# Patient Record
Sex: MALE | Race: WHITE
[De-identification: names, ages, dates, MRNs, and addresses within clinical notes are randomized per-mention and may not be internally consistent; named-entity substitution may affect disease eponyms.]

---

## 2019-03-15 ENCOUNTER — HOSPITAL ENCOUNTER (INPATIENT)
Dept: HOSPITAL 80 - F3N | Age: 74
LOS: 5 days | Discharge: NURSING, REHAB OR LONG-TERM CARE | DRG: 455 | End: 2019-03-20
Attending: NEUROLOGICAL SURGERY | Admitting: NEUROLOGICAL SURGERY
Payer: COMMERCIAL

## 2019-03-15 DIAGNOSIS — M96.0: Primary | ICD-10-CM

## 2019-03-15 DIAGNOSIS — Z98.1: ICD-10-CM

## 2019-03-15 DIAGNOSIS — R35.1: ICD-10-CM

## 2019-03-15 DIAGNOSIS — N40.1: ICD-10-CM

## 2019-03-15 PROCEDURE — C1713 ANCHOR/SCREW BN/BN,TIS/BN: HCPCS

## 2019-03-15 PROCEDURE — C1763 CONN TISS, NON-HUMAN: HCPCS

## 2019-03-15 PROCEDURE — 0SG3071 FUSION OF LUMBOSACRAL JOINT WITH AUTOLOGOUS TISSUE SUBSTITUTE, POSTERIOR APPROACH, POSTERIOR COLUMN, OPEN APPROACH: ICD-10-PCS | Performed by: NEUROLOGICAL SURGERY

## 2019-03-15 PROCEDURE — 0QP004Z REMOVAL OF INTERNAL FIXATION DEVICE FROM LUMBAR VERTEBRA, OPEN APPROACH: ICD-10-PCS | Performed by: NEUROLOGICAL SURGERY

## 2019-03-15 PROCEDURE — 0SG30A0 FUSION OF LUMBOSACRAL JOINT WITH INTERBODY FUSION DEVICE, ANTERIOR APPROACH, ANTERIOR COLUMN, OPEN APPROACH: ICD-10-PCS | Performed by: NEUROLOGICAL SURGERY

## 2019-03-15 PROCEDURE — 0SB40ZZ EXCISION OF LUMBOSACRAL DISC, OPEN APPROACH: ICD-10-PCS | Performed by: NEUROLOGICAL SURGERY

## 2019-03-15 PROCEDURE — 0SG0071 FUSION OF LUMBAR VERTEBRAL JOINT WITH AUTOLOGOUS TISSUE SUBSTITUTE, POSTERIOR APPROACH, POSTERIOR COLUMN, OPEN APPROACH: ICD-10-PCS | Performed by: NEUROLOGICAL SURGERY

## 2019-03-15 RX ADMIN — Medication SCH MLS: at 16:25

## 2019-03-15 RX ADMIN — Medication SCH MLS: at 23:30

## 2019-03-15 RX ADMIN — MEPERIDINE HYDROCHLORIDE PRN MG: 25 INJECTION, SOLUTION INTRAMUSCULAR; INTRAVENOUS; SUBCUTANEOUS at 13:35

## 2019-03-15 RX ADMIN — DIAZEPAM SCH MG: 5 TABLET ORAL at 20:05

## 2019-03-15 RX ADMIN — FAMOTIDINE SCH MG: 20 TABLET, FILM COATED ORAL at 20:05

## 2019-03-15 RX ADMIN — OXYCODONE HYDROCHLORIDE PRN MG: 15 TABLET ORAL at 16:23

## 2019-03-15 RX ADMIN — DIAZEPAM SCH MG: 5 TABLET ORAL at 16:23

## 2019-03-15 RX ADMIN — MEPERIDINE HYDROCHLORIDE PRN MG: 25 INJECTION, SOLUTION INTRAMUSCULAR; INTRAVENOUS; SUBCUTANEOUS at 13:31

## 2019-03-15 RX ADMIN — MORPHINE SULFATE SCH MG: 30 TABLET, EXTENDED RELEASE ORAL at 20:05

## 2019-03-15 RX ADMIN — SODIUM CHLORIDE SCH MLS: 900 INJECTION, SOLUTION INTRAVENOUS at 14:58

## 2019-03-15 RX ADMIN — HYDROMORPHONE HYDROCHLORIDE PRN MG: 1 INJECTION, SOLUTION INTRAMUSCULAR; INTRAVENOUS; SUBCUTANEOUS at 14:40

## 2019-03-15 RX ADMIN — ACETAMINOPHEN SCH: 500 TABLET ORAL at 14:53

## 2019-03-15 RX ADMIN — ACETAMINOPHEN SCH MG: 500 TABLET ORAL at 20:07

## 2019-03-15 RX ADMIN — DIAZEPAM SCH: 5 TABLET ORAL at 14:52

## 2019-03-15 RX ADMIN — DOCUSATE SODIUM AND SENNOSIDES SCH TAB: 50; 8.6 TABLET ORAL at 20:05

## 2019-03-15 NOTE — PDHPUP
History & Physical Update


H&P update statement: 


This history and physical update is based on an assessment of the patient which 

was completed after admission or registration (within 24 hours), but prior to 

the surgery/procedure.





H&P update: H&P reviewed & patient examined, no change in patient's condition 

since H&P completed (Consents signed and site marked. All questions answered.  )

## 2019-03-15 NOTE — POSTOPPROG
Post Op Note


Date of Operation: 03/15/19


Surgeon: Finesse Alcaraz


Assistant: ARACELIS Deng PAC


Anesthesia: GET(General Endotracheal)


Pre-op Diagnosis: Psedoarthoresis, back pain


Post-op Diagnosis: Psedoarthoresis, back pain


Indication: Psedoarthoresis, back pain


Procedure: L5/S1 ALIF, L4-S1 exploration and revison of prior fusion


Inf/Abcess present in the surg proc area at time of surgery?: No


EBL: 100-500


Drains: Hemovac, Diego Stroud





PA Addendum





- Addendum


.: 





S: low back pain


O: NAD A&Ox3 MAEx4 5/5 and equal in BUE and BLE





74y/o male s/p L5/S1 ALIF, L4-S1 exploration and revision of prior fusion





-Post op xrays pending


-PT/OT


-JULIA x1


-CLD, advance per general surgery


-LSO when OOB


-DVT prophx: TEDs, SCDs, Lovenox okay POD 1


-Please notify NS with any change in neuro/motor exam

## 2019-03-15 NOTE — ASMTCMCOM
CM Note

 

CM Note                       

Notes:

Pt had planned spinal surgery, resides with wife. PT/OT evals pending. CM to follow for d/c 

planning. 

 

Date Signed:  03/15/2019 03:21 PM

Electronically Signed By:BENEDICT Naylor

## 2019-03-15 NOTE — GOP
[f 
rep st]



                                                                OPERATIVE REPORT





DATE OF OPERATION:  03/15/2019



SURGEON:  Finesse Alcaraz MD



CO-SURGEON:  Brad Leal MD



ASSISTANT:  LUIS De La Rosa



ANESTHESIA:  General.



COMPLICATIONS:  None.



PREOPERATIVE DIAGNOSIS:  

1.  L5-S1 pseudoarthrosis with history of prior lumbar fusion L4-S1 as well as 
bilateral sacroiliac joint fusion.

2.  Ongoing low back pain.

3.  Treatment refractory to non-operative intervention.



POSTOPERATIVE DIAGNOSIS:  

1.  L5-S1 pseudoarthrosis with history of prior lumbar fusion L4-S1 as well as 
bilateral sacroiliac joint fusion.

2.  Ongoing low back pain.

3.  Treatment refractory to non-operative intervention.



PROCEDURE PERFORMED:  

1.  Anterior arthrodesis with approach to L5-S1.

2.  Exploration of prior anterior lumbar hardware and subsequent removal of 
anterior interbody hardware at the L5-S1 level.

3.  Redo L5-S1 diskectomy and interbody fusion using a 12 degree 16 x 30 x 24 
mm Medtronic perimeter peek cage filled with morselized autograft and allograft.

4.  Anterior lumbar fusion L5-S1 with a large Medtronic Pivox plate with 1 
screw into L5 and 1 screw into S1.

5.  Use of intraoperative fluoroscopy, less than 1 hour physician time.

6.  Use of neuromonitoring.



FINDINGS:  per imaging



SPECIMENS:  None.



ESTIMATED BLOOD LOSS:  150 mL.



INDICATIONS:  The patient is a very pleasant gentleman who has undergone 
multiple lumbar prior spinal surgeries including bilateral SI joint fusions.  
He continued to have ongoing low back pain.  Imaging demonstrated some 
lucencies around the bilateral S1 screws with no definitive evidence of L5-S1 
fusion.  After discussion of risks, benefits, and treatment alternatives, we 
decided to proceed with surgery as described above.  Please note, this is stage 
I of a planned 2 stage surgical procedure.  Stage 2 will be a posterior 
approach described in a separate operative report.



DESCRIPTION OF PROCEDURE:  The patient was brought to operating theater and 
underwent general endotracheal anesthesia without complications.  He had 
Venodynes, NADEEN hose, and the appropriate lines placed by Anesthesia.  His arms 
were placed across and a small bump placed on the lumbar spine.  The lower 
abdominal region was prepped and draped in the usual sterile surgical fashion.  
A time-out was completed per protocol and the patient received antibiotics 
within 1 hour of incision. 



Dr. Leal and his team will then dictate in a separate operative report the 
anterior abdominal approach to get us to the anterior aspect of L5-S1.  Once 
this was completed, we confirmed our level using lateral fluoroscopy.   We 
incised the anterior L5-S1 disk space with an 11 blade.  We were able to 
identify the prior hardware at the L5-S1 space and this did appear to be loose 
upon further exploration with a Kocher.  We used the bur tip on the drill bit 
and osteotomes to remove the prior interbody cage at the L5-S1 level which was 
then passed off the field.  We used the drill bit, curettes and Kerrison 
punches to complete a redo L5-S1 diskectomy.  We prepared the cartilaginous 
endplates and measured the interbody space.  We placed a 12 degree 16 x 30 x 24 
mm Perimeter peek cage filled with morselized autograft and allograft into the 
L5-S1 disk space.  We then drilled down the anterior osteophytes and secured a 
large Medtronic Pivox plate with a screw into L5 and a screw into S1.  



AP and lateral x-rays demonstrated good placement of the hardware.  Dr. Brad Leal will then dictate in a separate operative report the abdominal closure. 



The patient's neuro monitoring was stable by the end of this case.  The sponge, 
needle and instrument counts were correct.





Job #:  [id number]

MTDD

## 2019-03-15 NOTE — PDMN
Medical Necessity


Medical necessity: MCG:  S820 lumbar fusion   INPT only   OP:   L5/S1 

anterior lumbar fusion  L5/S1 posterior lumbar fusion with hardware revision--.

  AUTH# [id number] APPROVED FOR CPT CODES 53748, 85864, 91190, 66550, 90654, 

11601 AND 32366 TO BE DONE INPATIENT. 2 DAYS LOS.

## 2019-03-15 NOTE — GOP
[f 
rep st]



                                                                OPERATIVE REPORT





DATE OF OPERATION:  03/15/2019



SURGEON:  Finesse Alcaraz MD



ASSISTANT:  Belle Deng PA-C.



ANESTHESIA:  General.



PREOPERATIVE DIAGNOSIS:  

1.  L5-S1 pseudoarthrosis with history of prior lumbar fusion L4 through S1 and 
bilateral sacroiliac joint fusions.

2.  Low back pain. 

3.  Treatment refractory to non-operative intervention.



POSTOPERATIVE DIAGNOSIS:  

1.  L5-S1 pseudoarthrosis with history of prior lumbar fusion L4 through S1 and 
bilateral sacroiliac joint fusions.

2.  Low back pain. 

3.  Treatment refractory to non-operative intervention.



PROCEDURE PERFORMED:  

1.  Posterior arthrodesis with approach to L4, L5, and S1.

2.  Exploration of lumbar hardware with subsequent removal of L4, L5, and S1 
pedicle screws from the Solera 4.75 system.

3.  Posterolateral fusion with bilateral pedicle screw placement into L4, L5, 
and S1 from the Medtronic Solera 5.5/6.0 system.

4.  Posterolateral fusion on the left between L4 and S1 with morselized 
autograft and allograft.

5.  Use of intraoperative 3D Stealth navigation.

6.  Use of intraoperative fluoroscopy, less than 1 hour physician time.

7.  Use of neuromonitoring.

8.  Injection of preservative-free intrathecal narcotics.



FINDINGS:  per imaging



SPECIMENS:  Cultures were taken from the old hardware was sent to microbiology.



ESTIMATED BLOOD LOSS:  30 mL



INDICATIONS:  The patient is a very pleasant gentleman who has undergone 
multiple spinal fusions including bilateral SI joint fusions for ongoing low 
back pain.  Imaging demonstrated a possible loosening of the bilateral S1 screws
, and there was concern for possible pseudoarthrosis.  After discussion of the 
risks, benefits, and alternatives, and after failing nonoperative intervention, 
I decided to proceed forth with surgery as described above.



DESCRIPTION OF PROCEDURE:  Please note that this is stage 2 of a planned 2-
stage surgical procedure.  Stage 1 is dictated in a separate operative report 
and was the anterior approach to the L5-S1. 



The patient was already asleep at the time of the completion of stage 1.  At 
this point, he was flipped prone onto the Diego table, and all bony 
prominences were inspected and padded.  The previous lumbar incision was 
identified and marked.  This area was prepped and draped in usual sterile 
surgical fashion.  A time-out was completed again per protocol, and the patient 
received re-dosing of antibiotics per protocol.  The incision was infiltrated 
with Marcaine with epinephrine.  Using the monopolar, a dissection was taken 
down midline to the lumbodorsal fascia, and we completed out dissection 
laterally to identify the prior hardware at the L4, L5, and S1 levels.  Care 
was taken to avoid the midline defect from his prior laminectomy.  We removed 
the bilateral cap screws in the L4, L5, and S1 levels, as well as bilateral 
rods and passed them off the field.  We then used a distractor to explore the L4
-L5 and L5-S1 levels.  The S1 screws were noted to be slightly loose.  We then 
removed the pedicle screws from the L4, L5, and S1 levels and culture swab of 
these to ensure that there was no infection.  We then replaced the screws with 
7.5 x 50 mm screw on the left at L4, 7.5 x 55 mm screw on the right at L4, 7.5 
x 40 mm screw on the left at L5, 7.5 x 45 mm screw on the right L5, and left at 
S1 an 8.5 x 45 mm screw on the right S1, all from the Medtronics Solera 5.5 x 
60 System.  A 3D navigation spin demonstrated good placement of the hardware.  



We decorticated the bone on the left side between L4 and S1.  We placed 2 
lordotic rods into the heads of the screws between L4 and S1 and secured them 
down with cap screws which were then tightened to the 's setting.  
We placed morselized autograft and allograft on the left side between L4 and S1 
for the posterolateral fusion.  We injected preservative-free intrathecal 
narcotics.  A drain was left in the subfascial space.  The wound then closed in 
multiple layers including Vicryl sutures deep layers and Dermabond for skin.  
The patient's wound was dressed sterilely.  



He was flipped supine onto the transfer cart, where he was awakened, extubated, 
taken to recovery room in stable condition. 



There were no complications and no noted changes on neuromonitoring throughout 
the procedure.



COMPLICATIONS:  None.





Job #:  [id number]

MTDD

## 2019-03-15 NOTE — PDANEPAE
ANE History of Present Illness





previous spine surgeries with ongoing lumbosacral radiculopathy





ANE Past Medical History





- Cardiovascular History


Hx Hypertension: No


Hx Arrhythmias: No


Hx Chest Pain: No


Hx Coronary Artery / Peripheral Vascular Disease: No


Hx CHF / Valvular Disease: No


Hx Palpitations: No





- Pulmonary History


Hx COPD: No


Hx Asthma/Reactive Airway Disease: No


Hx Recent Upper Respiratory Infection: No


Hx Oxygen in Use at Home: No


Hx Sleep Apnea: No


Sleep Apnea Screening Result - Last Documented: Negative


Pulmonary History Comment: USES O2 WHEN AT 10,000 FT AT TIMES TO PREVENT 

HEADACHES





- Neurologic History


Hx Cerebrovascular Accident: No


Hx Seizures: No


Hx Dementia: No


Neurologic History Comment: HX OF SPINAL FUSIONS





- Endocrine History


Hx Diabetes: No





- Renal History


Hx Renal Disorders: Yes


Renal History Comment: HX OF PREV STONE





- Liver History


Hx Hepatic Disorders: No





- Neurological & Psychiatric Hx


Hx Neurological and Psychiatric Disorders: No





- Cancer History


Hx Cancer: Yes


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





- Congenital Disorder History


Hx Congenital Disorders: No





- GI History


Hx Gastrointestinal Disorders: Yes


Gastrointestinal History Comment: CONSTIPATION





- Other Health History


Other Health History: SACROLITIS.  WEARS GLASSES





- Chronic Pain History


Chronic Pain: Yes (LOWER BACK WITH HIPS)





- Surgical History


Prior Surgeries: 11/01/18 SI FUSION WITH RAJPAL.  LUMBAR FUSION 11/2017 AT [facility].

  LUMBAR FUSION 10/2017 WITH POST HARDWARE REMVL.  LITHOTRIPSY.  NERVE ABLATION 

SI 1-3 BILATERAL





ANE Review of Systems


Review of systems is: negative


Review of Systems: 








- Exercise capacity


METS (RN): 4 METS





ANE Patient History





- Allergies


Allergies/Adverse Reactions: 








gabapentin Allergy (Verified 03/15/19 05:54)


 MADE HIM FEEL LIKE HE WAS DYING








- Home Medications


Home medications: home medication list seen and reviewed


Home Medications: 








Herbals/Supplements -Info Only 1 each PO DAILY 10/09/18 [Last Taken 03/08/19]


Acetaminophen [Tylenol  mg (*)] 500 mg PO Q6 PRN 02/26/19 [Last Taken 03/ 14/19]


Ascorbic Acid [Vitamin C 500 mg (*)] 1,000 mg PO DAILY 02/26/19 [Last Taken 03/ 08/19]


Cholecalciferol Vit D3 [Vitamin D3 2000 units tab (OTC)] 2,000 units PO DAILY 02 /26/19 [Last Taken 03/08/19]


Diazepam [Valium 5 MG (*)] 5 mg PO QID 02/26/19 [Last Taken 03/15/19]


Morphine Sulfate [Ms Contin] 30 mg PO BID 02/26/19 [Last Taken 03/15/19]


Polyethylene Glycol 3350 [Miralax 17 gm (*)] 17 gm PO DAILY 02/26/19 [Last 

Taken 03/12/19]


Tamsulosin HCl [Flomax 0.4 MG (*)] 0.4 mg PO DAILY 02/26/19 [Last Taken 03/01/19

]


Vitamin B Complex [Vitamin B Complex (OTC)] 1 each PO DAILY 02/26/19 [Last 

Taken 03/08/19]


oxyCODONE IR [Oxycodone Ir (*)] 10 mg PO QID PRN 02/26/19 [Last Taken 03/15/19]








- NPO status


NPO Since - Liquids (Date): 03/14/19


NPO Since - Liquids (Time): 20:00


NPO Since - Solids (Date): 12/31/13





- Anes Hx


Anes Hx: no prior problems





- Smoking Hx


Smoking Status: Never smoked





- Alcohol Use


Alcohol Use: None





- Family Anes Hx


Family Anes Hx: none


Family Hx Anesthesia Complications: NONE





ANE Labs/Vital Signs





- Vital Signs


Blood Pressure: 161/93


Heart Rate: 60


Respiratory Rate: 16


O2 Sat (%): 94


Height: 167.64 cm


Weight: 83.461 kg





ANE Physical Exam





- Airway


Neck exam: FROM


Mallampati Score: Class 1


Mouth exam: normal dental/mouth exam





- Pulmonary


Pulmonary: no respiratory distress





- Cardiovascular


Cardiovascular: regular rate and rhythym





- ASA Status


ASA Status: III





ANE Anesthesia Plan


Anesthesia Plan: general endotracheal anesthesia


Lines/Monitors: arterial line, additional IV

## 2019-03-16 RX ADMIN — SODIUM CHLORIDE SCH MLS: 900 INJECTION, SOLUTION INTRAVENOUS at 03:18

## 2019-03-16 RX ADMIN — ENOXAPARIN SODIUM SCH MG: 100 INJECTION SUBCUTANEOUS at 16:17

## 2019-03-16 RX ADMIN — DOCUSATE SODIUM AND SENNOSIDES SCH TAB: 50; 8.6 TABLET ORAL at 08:01

## 2019-03-16 RX ADMIN — TAMSULOSIN HYDROCHLORIDE SCH MG: 0.4 CAPSULE ORAL at 08:01

## 2019-03-16 RX ADMIN — Medication SCH EA: at 08:01

## 2019-03-16 RX ADMIN — OXYCODONE HYDROCHLORIDE PRN MG: 15 TABLET ORAL at 03:24

## 2019-03-16 RX ADMIN — ACETAMINOPHEN SCH MG: 500 TABLET ORAL at 13:31

## 2019-03-16 RX ADMIN — Medication SCH UNITS: at 08:01

## 2019-03-16 RX ADMIN — DIAZEPAM SCH MG: 5 TABLET ORAL at 05:12

## 2019-03-16 RX ADMIN — DIAZEPAM SCH MG: 5 TABLET ORAL at 15:37

## 2019-03-16 RX ADMIN — DIAZEPAM SCH MG: 5 TABLET ORAL at 20:35

## 2019-03-16 RX ADMIN — ACETAMINOPHEN SCH MG: 500 TABLET ORAL at 05:12

## 2019-03-16 RX ADMIN — MORPHINE SULFATE SCH MG: 30 TABLET, EXTENDED RELEASE ORAL at 20:35

## 2019-03-16 RX ADMIN — FAMOTIDINE SCH MG: 20 TABLET, FILM COATED ORAL at 08:01

## 2019-03-16 RX ADMIN — DOCUSATE SODIUM AND SENNOSIDES SCH: 50; 8.6 TABLET ORAL at 20:39

## 2019-03-16 RX ADMIN — OXYCODONE HYDROCHLORIDE PRN MG: 15 TABLET ORAL at 15:35

## 2019-03-16 RX ADMIN — DIAZEPAM SCH: 5 TABLET ORAL at 12:44

## 2019-03-16 RX ADMIN — MORPHINE SULFATE SCH: 30 TABLET, EXTENDED RELEASE ORAL at 09:14

## 2019-03-16 RX ADMIN — ACETAMINOPHEN SCH MG: 500 TABLET ORAL at 20:37

## 2019-03-16 RX ADMIN — FAMOTIDINE SCH MG: 20 TABLET, FILM COATED ORAL at 20:35

## 2019-03-16 RX ADMIN — OXYCODONE HYDROCHLORIDE PRN MG: 15 TABLET ORAL at 16:21

## 2019-03-16 NOTE — ASMTCMCOM
CM Note

 

CM Note                       

Notes:

PT is recommending HC. OT is recommending Home w/ 24 hr supervision. CM met w/ pt for dispo 

planning. Pt deferred d/c planning to his wife Mayela. CM called Mayela (P#: [phone]) and spoke to 

her about the recommendations. Mayela would like a referral made to [facility]. [facility] is able to accept. CM 

confirmed pts address and phone number. Pts PCP is Dr. Osborne. CM to follow.







Plan: [facility]; PT, possibly OT

 

Date Signed:  03/16/2019 01:29 PM

Electronically Signed By:TRINA Benoit

## 2019-03-16 NOTE — SOAPPROG
SOAP Progress Note


Assessment/Plan: 


Assessment/Plan: 74yo M POD#1 s/p anterior exposure by Dr. Leal for L5/S1 ALIF

, L4-S1 exploration and revision of prior fusion by Dr. Alcaraz. 


Pain in both abd and back - controlled


No flatus and hypoactive BS - Continue clears until return of bowel function


NSG managing


Seen with Dr. Ortiz





S:  Complains of pain this morning, worse in back than abdomen.  No nausea.  No 

flatus.


O:


General:  Lying in bed, comfortable, no acute distress


Respiratory:  No increased work of breathing


Abdomen:  Hypoactive bowel sounds, soft, nontender.  Dressing intact.


Skin: Warm and dry. 


Psych: Mood and affect normal


Neuro: Grossly intact


03/16/19 10:29





03/16/19 10:30





Objective: 





 Vital Signs











Temp Pulse Resp BP Pulse Ox


 


 36.3 C   67   17   83/43 L  91 L


 


 03/16/19 07:54  03/16/19 07:54  03/16/19 07:54  03/16/19 07:54  03/16/19 07:54








 Microbiology











 03/15/19 12:24 Gram Stain - Final





 Other - Eswab 








 Laboratory Results





 03/15/19 13:35 





 











 03/15/19 03/16/19 03/17/19





 05:59 05:59 05:59


 


Intake Total  4219 300


 


Output Total  1090 


 


Balance  3129 300














ICD10 Worksheet


Patient Problems: 


 Problems











Problem Status Onset


 


Pseudarthrosis after fusion or arthrodesis Acute

## 2019-03-16 NOTE — SOAPPROG
SOAP Progress Note


Assessment/Plan: 


Assessment:


 POD #1 sp L5/S1 ALIF, L4-S1 exploration and revison of prior fusion. 


Doing well with resolution of left leg pain











Plan:


advance diet per Gen surg.


Xrays Lumbar spine when he can tolerate


Continue JULIA


PT/OT


LSO when OOB


03/16/19 10:01





03/16/19 10:07





03/16/19 10:50





Subjective: 





lying in bed, comfortable.


Left leg pain better


ate breakfast, states he is passing gas and wants to have a BM


Denies new N/T/W


Objective: 





 Vital Signs











Temp Pulse Resp BP Pulse Ox


 


 36.3 C   67   17   83/43 L  91 L


 


 03/16/19 07:54  03/16/19 07:54  03/16/19 07:54  03/16/19 07:54  03/16/19 07:54








 Microbiology











 03/15/19 12:24 Gram Stain - Final





 Other - Eswab 








 Laboratory Results





 03/15/19 13:35 





 











 03/15/19 03/16/19 03/17/19





 05:59 05:59 05:59


 


Intake Total  4219 300


 


Output Total  1090 


 


Balance  3129 300








Neuro:


awake, alert, BRIONES, sens +Lt


follows commands 





Abdomen: soft, mildly tender, non- distended


Ant incision: CDI


Post incision: CDI





JULIA: 90 ml





ICD10 Worksheet


Patient Problems: 


 Problems











Problem Status Onset


 


Pseudarthrosis after fusion or arthrodesis Acute  














- ICD10 Problem Qualifiers


(1) Pseudarthrosis after fusion or arthrodesis

## 2019-03-17 RX ADMIN — Medication SCH EA: at 07:44

## 2019-03-17 RX ADMIN — DIAZEPAM SCH MG: 5 TABLET ORAL at 11:49

## 2019-03-17 RX ADMIN — TAMSULOSIN HYDROCHLORIDE SCH MG: 0.4 CAPSULE ORAL at 07:44

## 2019-03-17 RX ADMIN — HYDROMORPHONE HYDROCHLORIDE PRN MG: 1 INJECTION, SOLUTION INTRAMUSCULAR; INTRAVENOUS; SUBCUTANEOUS at 10:38

## 2019-03-17 RX ADMIN — ENOXAPARIN SODIUM SCH MG: 100 INJECTION SUBCUTANEOUS at 07:44

## 2019-03-17 RX ADMIN — FAMOTIDINE SCH MG: 20 TABLET, FILM COATED ORAL at 19:58

## 2019-03-17 RX ADMIN — Medication SCH UNITS: at 07:44

## 2019-03-17 RX ADMIN — ACETAMINOPHEN SCH MG: 500 TABLET ORAL at 15:02

## 2019-03-17 RX ADMIN — DIAZEPAM SCH MG: 5 TABLET ORAL at 15:54

## 2019-03-17 RX ADMIN — OXYCODONE HYDROCHLORIDE PRN MG: 15 TABLET ORAL at 22:32

## 2019-03-17 RX ADMIN — OXYCODONE HYDROCHLORIDE PRN MG: 15 TABLET ORAL at 15:53

## 2019-03-17 RX ADMIN — DOCUSATE SODIUM AND SENNOSIDES SCH: 50; 8.6 TABLET ORAL at 07:44

## 2019-03-17 RX ADMIN — OXYCODONE HYDROCHLORIDE PRN MG: 15 TABLET ORAL at 07:41

## 2019-03-17 RX ADMIN — OXYCODONE HYDROCHLORIDE PRN MG: 15 TABLET ORAL at 11:49

## 2019-03-17 RX ADMIN — DIAZEPAM SCH MG: 5 TABLET ORAL at 19:58

## 2019-03-17 RX ADMIN — OXYCODONE HYDROCHLORIDE PRN MG: 15 TABLET ORAL at 02:58

## 2019-03-17 RX ADMIN — FAMOTIDINE SCH MG: 20 TABLET, FILM COATED ORAL at 07:41

## 2019-03-17 RX ADMIN — DOCUSATE SODIUM AND SENNOSIDES SCH TAB: 50; 8.6 TABLET ORAL at 19:57

## 2019-03-17 RX ADMIN — MORPHINE SULFATE SCH MG: 30 TABLET, EXTENDED RELEASE ORAL at 19:58

## 2019-03-17 RX ADMIN — ACETAMINOPHEN SCH MG: 500 TABLET ORAL at 06:00

## 2019-03-17 RX ADMIN — DOCUSATE SODIUM AND SENNOSIDES SCH TAB: 50; 8.6 TABLET ORAL at 15:03

## 2019-03-17 RX ADMIN — ACETAMINOPHEN SCH MG: 500 TABLET ORAL at 22:32

## 2019-03-17 RX ADMIN — DIAZEPAM SCH MG: 5 TABLET ORAL at 05:16

## 2019-03-17 RX ADMIN — MORPHINE SULFATE SCH MG: 30 TABLET, EXTENDED RELEASE ORAL at 07:41

## 2019-03-17 NOTE — SOAPPROG
SOAP Progress Note


Assessment/Plan: 


Assessment:


 POD #2 sp L5/S1 ALIF, L4-S1 exploration and revison of prior fusion. 


Doing well with resolution of left leg pain


Tenderness in RUQ abdomen











Plan:


advance diet per Gen surg.


Xrays Lumbar spine today


Remove JULIA drain today.


PT/OT


LSO when OOB


May need rehab.





03/17/19 09:12





Subjective: 





Lying in bed. Comfortable. Denies new numbness tingling or weakness.


Has some right sided abdominal pain. 


Objective: 





 Vital Signs











Temp Pulse Resp BP Pulse Ox


 


 36.9 C   73   18   114/62   91 L


 


 03/17/19 07:53  03/17/19 07:53  03/17/19 07:53  03/17/19 07:53  03/17/19 07:53








 Microbiology











 03/15/19 12:24 Gram Stain - Final





 Other - Eswab 








 Laboratory Results





 03/15/19 13:35 





 











 03/16/19 03/17/19 03/18/19





 05:59 05:59 05:59


 


Intake Total 4219 1600 


 


Output Total 1090 2265 


 


Balance 3129 -665 








Neuro;


BRIONES, sens +LT


FC x 4


oriented x 3





Dressing: CDI


JULIA: 15ml





ICD10 Worksheet


Patient Problems: 


 Problems











Problem Status Onset


 


Pseudarthrosis after fusion or arthrodesis Acute  














- ICD10 Problem Qualifiers


(1) Pseudarthrosis after fusion or arthrodesis

## 2019-03-17 NOTE — SOAPPROG
SOAP Progress Note


Assessment/Plan: 


Assessment/Plan: 74yo M POD#2 s/p anterior exposure by Dr. Leal for L5/S1 ALIF

, L4-S1 exploration and revision of prior fusion by Dr. Alcaraz. 


Pain in both abd and back - controlled


No flatus this am, large BM overnight - continue clears until return of bowel 

function


NSG managing





S:  Complains of pain this morning, worse in back than abdomen.  Tolerating 

clears but decreased appetite. Not hungry. BM last night, no flatus this am


O:


General:  sitting upright in chair, wincing in pain


Respiratory:  No increased work of breathing


Abdomen:  Hypoactive bowel sounds, soft, nontender.  Dressing intact.incision 

cdi


Psych: Mood and affect normal


Neuro: Grossly intact


MSK: Brace in place


03/17/19 10:46





Objective: 





 Vital Signs











Temp Pulse Resp BP Pulse Ox


 


 36.9 C   73   18   114/62   91 L


 


 03/17/19 07:53  03/17/19 07:53  03/17/19 07:53  03/17/19 07:53  03/17/19 07:53








 Microbiology











 03/15/19 12:24 Gram Stain - Final





 Other - Eswab 








 Laboratory Results





 03/15/19 13:35 





 











 03/16/19 03/17/19 03/18/19





 05:59 05:59 05:59


 


Intake Total 4219 1600 


 


Output Total 4120 8215 


 


Balance 5397 -356 














ICD10 Worksheet


Patient Problems: 


 Problems











Problem Status Onset


 


Pseudarthrosis after fusion or arthrodesis Acute

## 2019-03-18 RX ADMIN — OXYCODONE HYDROCHLORIDE PRN MG: 15 TABLET ORAL at 05:02

## 2019-03-18 RX ADMIN — ACETAMINOPHEN SCH MG: 500 TABLET ORAL at 15:04

## 2019-03-18 RX ADMIN — OXYCODONE HYDROCHLORIDE PRN MG: 15 TABLET ORAL at 09:08

## 2019-03-18 RX ADMIN — DOCUSATE SODIUM AND SENNOSIDES SCH TAB: 50; 8.6 TABLET ORAL at 09:09

## 2019-03-18 RX ADMIN — DIAZEPAM SCH: 5 TABLET ORAL at 15:06

## 2019-03-18 RX ADMIN — MORPHINE SULFATE SCH MG: 30 TABLET, EXTENDED RELEASE ORAL at 21:22

## 2019-03-18 RX ADMIN — FAMOTIDINE SCH MG: 20 TABLET, FILM COATED ORAL at 21:22

## 2019-03-18 RX ADMIN — ENOXAPARIN SODIUM SCH MG: 100 INJECTION SUBCUTANEOUS at 09:09

## 2019-03-18 RX ADMIN — DIAZEPAM SCH MG: 5 TABLET ORAL at 21:22

## 2019-03-18 RX ADMIN — Medication SCH EA: at 09:09

## 2019-03-18 RX ADMIN — DIAZEPAM SCH MG: 5 TABLET ORAL at 15:06

## 2019-03-18 RX ADMIN — TAMSULOSIN HYDROCHLORIDE SCH MG: 0.4 CAPSULE ORAL at 09:09

## 2019-03-18 RX ADMIN — Medication SCH UNITS: at 09:09

## 2019-03-18 RX ADMIN — FAMOTIDINE SCH MG: 20 TABLET, FILM COATED ORAL at 09:09

## 2019-03-18 RX ADMIN — ACETAMINOPHEN SCH MG: 500 TABLET ORAL at 15:06

## 2019-03-18 RX ADMIN — DIAZEPAM SCH MG: 5 TABLET ORAL at 05:02

## 2019-03-18 RX ADMIN — MORPHINE SULFATE SCH MG: 30 TABLET, EXTENDED RELEASE ORAL at 09:09

## 2019-03-18 RX ADMIN — ACETAMINOPHEN SCH: 500 TABLET ORAL at 05:38

## 2019-03-18 RX ADMIN — DOCUSATE SODIUM AND SENNOSIDES SCH TAB: 50; 8.6 TABLET ORAL at 21:21

## 2019-03-18 NOTE — SOAPPROG
SOAP Progress Note


Assessment/Plan: 


Assessment: 72 yo M POD #3 L5/S1 ALIF and L4-S1 posterior fusion


























Plan:


stable and doing well overall :)


PT/OT


scd/sherly/lovenox for dvt prophylaxis


post op x-rays look great


diet per general surgery


dc planner to eval discharge options


please call with neuro changes


discussed with Dr Alcaraz 





03/18/19 07:17





Subjective: 





some back tightness, no leg pain, no weakness. 


Objective: 





 Vital Signs











Temp Pulse Resp BP Pulse Ox


 


 37.1 C   74   16   111/56 L  94 


 


 03/18/19 00:00  03/18/19 00:00  03/18/19 00:00  03/18/19 00:00  03/18/19 00:00








 Microbiology











 03/15/19 12:24 Gram Stain - Final





 Other - Eswab 








 Laboratory Results





 03/15/19 13:35 





 











 03/17/19 03/18/19 03/19/19





 05:59 05:59 05:59


 


Intake Total 1600  


 


Output Total 2265 400 


 


Balance -665 -400 








AAOx4, +FC


PERRL, EOMI, no facial droop


5/5


+ light touch


C/D/I 





ICD10 Worksheet


Patient Problems: 


 Problems











Problem Status Onset


 


Pseudarthrosis after fusion or arthrodesis Acute

## 2019-03-18 NOTE — SOAPPROG
SOAP Progress Note


Assessment/Plan: 


Assessment/Plan: 74yo M POD#3 s/p anterior exposure by Dr. Leal for L5/S1 ALIF

, L4-S1 exploration and revision of prior fusion by Dr. Alcaraz. 


Pain in both abd and back - controlled


Passing flatus and BMs.  Tolerating regular diet.


NSG managing





S: Having pain both in back and abdomen.  Waiting for pain meds.  Reports he's 

tolerating regular diet.  Denies nausea/vomiting.  





O: Alert


Afebrile


RRR


No increased WOB


Abdomen: soft, attp, incision cdi, normoactive BS


General:  sitting upright in chair, wincing in pain





03/18/19 10:37





Objective: 





 Vital Signs











Temp Pulse Resp BP Pulse Ox


 


 37.1 C   84   14   120/66   90 L


 


 03/18/19 07:14  03/18/19 07:14  03/18/19 07:14  03/18/19 07:14  03/18/19 07:14








 Microbiology











 03/15/19 12:24 Gram Stain - Final





 Other - Eswab 








 Laboratory Results





 03/15/19 13:35 





 











 03/17/19 03/18/19 03/19/19





 05:59 05:59 05:59


 


Intake Total 1600  


 


Output Total 226 400 120


 


Balance -665 -400 -120














ICD10 Worksheet


Patient Problems: 


 Problems











Problem Status Onset


 


Pseudarthrosis after fusion or arthrodesis Acute

## 2019-03-18 NOTE — ASMTCMCOM
CM Note

 

CM Note                       

Notes:

Today PT/OT rec SNF, pt and family agree. Pt requests referral to [facility]. [facility] [place] [facility] will be [facility] 

CM to follow. 



D/c plan: [employer] pending aceptance and insuance auth

 

Date Signed:  03/18/2019 03:06 PM

Electronically Signed By:BENEDICT Naylor

## 2019-03-19 RX ADMIN — DIAZEPAM SCH MG: 5 TABLET ORAL at 20:04

## 2019-03-19 RX ADMIN — FAMOTIDINE SCH MG: 20 TABLET, FILM COATED ORAL at 20:04

## 2019-03-19 RX ADMIN — MORPHINE SULFATE SCH MG: 30 TABLET, EXTENDED RELEASE ORAL at 08:20

## 2019-03-19 RX ADMIN — TAMSULOSIN HYDROCHLORIDE SCH MG: 0.4 CAPSULE ORAL at 08:20

## 2019-03-19 RX ADMIN — DIAZEPAM SCH MG: 5 TABLET ORAL at 05:34

## 2019-03-19 RX ADMIN — OXYCODONE HYDROCHLORIDE PRN MG: 15 TABLET ORAL at 00:05

## 2019-03-19 RX ADMIN — Medication SCH UNITS: at 08:20

## 2019-03-19 RX ADMIN — Medication SCH EA: at 08:20

## 2019-03-19 RX ADMIN — DOCUSATE SODIUM AND SENNOSIDES SCH: 50; 8.6 TABLET ORAL at 20:06

## 2019-03-19 RX ADMIN — DIAZEPAM SCH MG: 5 TABLET ORAL at 12:30

## 2019-03-19 RX ADMIN — FAMOTIDINE SCH MG: 20 TABLET, FILM COATED ORAL at 08:20

## 2019-03-19 RX ADMIN — DOCUSATE SODIUM AND SENNOSIDES SCH: 50; 8.6 TABLET ORAL at 10:49

## 2019-03-19 RX ADMIN — ACETAMINOPHEN SCH MG: 500 TABLET ORAL at 16:56

## 2019-03-19 RX ADMIN — DIAZEPAM SCH MG: 5 TABLET ORAL at 16:56

## 2019-03-19 RX ADMIN — MORPHINE SULFATE SCH MG: 30 TABLET, EXTENDED RELEASE ORAL at 22:41

## 2019-03-19 RX ADMIN — ACETAMINOPHEN SCH MG: 500 TABLET ORAL at 22:41

## 2019-03-19 RX ADMIN — ACETAMINOPHEN SCH MG: 500 TABLET ORAL at 05:35

## 2019-03-19 RX ADMIN — ENOXAPARIN SODIUM SCH MG: 100 INJECTION SUBCUTANEOUS at 08:20

## 2019-03-19 NOTE — SOAPPROG
SOAP Progress Note


Assessment/Plan: 


Assessment/Plan:  74yo M POD#4 s/p anterior exposure by Dr. Leal for L5/S1 ALIF

, L4-S1 exploration and revision of prior fusion by Dr. Alcaraz. 





Appears to be doing well. 


Pain controlled. 


Passing gas. 


Tolerating diet. 


Seen with Dr. Leal. 





Likely to SNF soon. 


Discussed limitations 2/2 abdominal surgery. 


Discussed outpatient f/u c general surgery. 





S: every day gets a little better.


O:


alert, nad, smiling


no wob


rrr


abd soft, inc cdi, no erythema.





03/19/19 12:24





Objective: 





 Vital Signs











Temp Pulse Resp BP Pulse Ox


 


 37.1 C   74   14   106/57 L  89 L


 


 03/19/19 07:22  03/19/19 07:22  03/19/19 07:22  03/19/19 07:22  03/19/19 07:22








 Microbiology











 03/15/19 12:24 Gram Stain - Final





 Other - Eswab 








 Laboratory Results





 03/15/19 13:35 





 











 03/18/19 03/19/19 03/20/19





 05:59 05:59 05:59


 


Intake Total  250 


 


Output Total 400 870 


 


Balance -400 -620 














ICD10 Worksheet


Patient Problems: 


 Problems











Problem Status Onset


 


Pseudarthrosis after fusion or arthrodesis Acute

## 2019-03-19 NOTE — NEUSURGPN
Assessment/Plan: 





Assessment: 74 yo M POD #4 L5/S1 ALIF and L4-S1 posterior fusion








Plan:


stable and doing well overall 


PT/OT


scd/sherly/lovenox for dvt prophylaxis


post op x-rays demonstrate intact hardware without evidence of failure


diet per general surgery


dc planner to eval discharge options, likely to SNF when bed available and 

cleared by gen surg/therapies


please call with neuro changes


Seen by Dr Alcaraz and myself. 


Subjective: 


stabbing pre surgical pain resolved. Incisional site low back pain. 


Objective: 


NAD A&Ox3 MAEx4 5/5 and equal in BUE and BLE


Incisions c/d/i





Catheter Insertion Date: 03/15/19





- Physician


Discussed Patient with : Lissa





Neurosurgery Physical Exam





- Vitals, I&O, Labs





 I and O











 03/18/19 03/19/19 03/20/19





 05:59 05:59 05:59


 


Intake Total  250 


 


Output Total 400 870 


 


Balance -400 -620 


 


Intake:   


 


  Oral (ml)  250 


 


Output:   


 


  Urine (ml) 400 870 


 


    Urinal 400 870 


 


Other:   


 


  Number of Voids   


 


    Toilet  1 


 


    Urinal 2 1 


 


  Number of Stools   


 


    Toilet  1 








 Microbiology











 03/15/19 12:24 Gram Stain - Final





 Other - Eswab 








 Vital Signs











Temp Pulse Resp BP Pulse Ox


 


 37.1 C   74   14   106/57 L  89 L


 


 03/19/19 07:22  03/19/19 07:22  03/19/19 07:22  03/19/19 07:22  03/19/19 07:22








 Laboratory Results





 03/15/19 13:35 











ICD10 Worksheet


Patient Problems: 


 Problems











Problem Status Onset


 


Pseudarthrosis after fusion or arthrodesis Acute

## 2019-03-20 VITALS — SYSTOLIC BLOOD PRESSURE: 106 MMHG | DIASTOLIC BLOOD PRESSURE: 61 MMHG

## 2019-03-20 RX ADMIN — ENOXAPARIN SODIUM SCH MG: 100 INJECTION SUBCUTANEOUS at 09:16

## 2019-03-20 RX ADMIN — FAMOTIDINE SCH MG: 20 TABLET, FILM COATED ORAL at 09:16

## 2019-03-20 RX ADMIN — DOCUSATE SODIUM AND SENNOSIDES SCH TAB: 50; 8.6 TABLET ORAL at 09:16

## 2019-03-20 RX ADMIN — DIAZEPAM SCH MG: 5 TABLET ORAL at 11:45

## 2019-03-20 RX ADMIN — Medication SCH EA: at 09:16

## 2019-03-20 RX ADMIN — ACETAMINOPHEN SCH MG: 500 TABLET ORAL at 05:24

## 2019-03-20 RX ADMIN — MORPHINE SULFATE SCH MG: 30 TABLET, EXTENDED RELEASE ORAL at 09:16

## 2019-03-20 RX ADMIN — DIAZEPAM SCH MG: 5 TABLET ORAL at 05:24

## 2019-03-20 RX ADMIN — Medication SCH UNITS: at 09:16

## 2019-03-20 RX ADMIN — OXYCODONE HYDROCHLORIDE PRN MG: 15 TABLET ORAL at 09:16

## 2019-03-20 RX ADMIN — TAMSULOSIN HYDROCHLORIDE SCH MG: 0.4 CAPSULE ORAL at 09:16

## 2019-03-20 NOTE — ASMTCMCOM
CM Note

 

CM Note                       

Notes:

Pt medically stable for d/c to [facility] SNF in [place], orders sent in [employer]. FAWN Kincaid to call 

report. Candi with [facility] scheduled wc transport. 



Pt updated and he will call his wife Mayela with the pickup time. 

 

Date Signed:  03/20/2019 11:12 AM

Electronically Signed By:BENEDICT Naylor

## 2019-03-20 NOTE — ASDISCHSUM
----------------------------------------------

Discharge Information

----------------------------------------------

Plan Status:SNF                                      Medically Cleared to Leave:

Discharge Date:03/20/2019 12:43 PM                    D/C Disposition:

ADT D/C Disposition:Skilled Nursing Facility         Projected Discharge Date:03/19/2019 11:00 AM

Transportation at D/C:                               Discharge Delay Reason:

Follow-Up Date:03/19/2019 11:00 AM                   Discharge Slot:

Final Diagnosis:

----------------------------------------------

Placement Information

----------------------------------------------

Referral Type:*Home Health Care Services             Referral ID:[id number]

Provider Name:

Address 1:                                           Phone Number:

Address 2:                                           Fax Number:

City:                                                Selection Factors:

State:

 

Referral Type:*Nursing Home/SNF                      Referral ID:[id number]

Provider Name:Mandeep smith [place]

Address 1:[address]                  Phone [phone]

Address 2:                                           Fax [phone]

City:[place]                                        Selection Factors:

State:[place]

 

----------------------------------------------

Patient Contact Information

----------------------------------------------

Contact Name:FOREIGN                          Relationship:Wife

Address:[address]                            Home [phone]

                                                     Work [phone]

City:[place]                                        Alternate Phone:

State/Zip Code:[place] [address]                              Email:

----------------------------------------------

Financial Information

----------------------------------------------

Financial Class:Medicare Advantage Plans

Primary Plan Desc:[employer] [facility] ADVANTAGE PLANS         Primary Plan Number:[id number]

Secondary Plan Desc:                                 Secondary Plan Number:

 

 

----------------------------------------------

Assessment Information

----------------------------------------------

----------------------------------------------

LACE

----------------------------------------------

LACE

 

Length of stay for            Answers:  4-6 days                              

current admission                                                             

Acuity / Level of             Answers:  Yes                                   

Care: Did the patient                                                         

have an inpatient                                                             

admission?                                                                    

# of Emergency department     Answers:  0                                     

visits in the last 6                                                          

months                                                                        

Score: 7

 

Date Signed:  03/20/2019 11:10 AM

Electronically Signed By:BENEDICT Naylor

 

 

----------------------------------------------

[facility] CM Progress Note

----------------------------------------------

CM Note

 

CM Note                       

Notes:

Pt had planned spinal surgery, resides with wife. PT/OT evals pending. CM to follow for d/c 

planning. 

 

Date Signed:  03/15/2019 03:21 PM

Electronically Signed By:BENEDICT Naylor

 

 

----------------------------------------------

[facility] CM Progress Note

----------------------------------------------

CM Note

 

CM Note                       

Notes:

PT is recommending HC. OT is recommending Home w/ 24 hr supervision. CM met w/ pt for dispo 

planning. Pt deferred d/c planning to his wife Mayela. CM called Mayela (P#: [phone]) and spoke to 

her about the recommendations. Mayela would like a referral made to [facility]. [facility] is able to accept. CM 

confirmed pts address and phone number. Pts PCP is Dr. Osborne. CM to follow.







Plan: [facility]; PT, possibly OT

 

Date Signed:  03/16/2019 01:29 PM

Electronically Signed By:TRINA Benoit

 

 

----------------------------------------------

[facility] CM Progress Note

----------------------------------------------

CM Note

 

CM Note                       

Notes:

Today PT/OT rec SNF, pt and family agree. Pt requests referral to [facility] [place]. Referral sent in 

[employer] auth will be needed. 

CM to follow. 



D/c plan: [facility] pending aceptance and insuance auth

 

Date Signed:  03/18/2019 03:06 PM

Electronically Signed By:BENEDICT Naylor

 

 

----------------------------------------------

[facility] CM Progress Note

----------------------------------------------

CM Note

 

CM Note                       

Notes:

Pt medically stable for d/c to [facility] in [place], orders sent in [employer]. FAWN Kincaid to call 

omega[employer]pper Christopher with [facility] scheduled wc transport. 



Pt updated and he will call his wife Mayela with the pickup time. 

 

Date Signed:  03/20/2019 11:12 AM

Electronically Signed By:BENEDICT Naylor

 

 

----------------------------------------------

Intervention Information

----------------------------------------------

## 2019-03-20 NOTE — PDIAF
- Diagnosis


Diagnosis: Lumbar stenosis


Code Status: Full Code





- Medication Management


Discharge Medications: electronically signed and located in the Home Medication 

List.





- Orders


Services needed: Registered Nurse, Certified Nursing Aide, Physical Therapy, 

Occupational Therapy


Diet Recommendation: no restrictions on diet


Diet Texture: Regular Texture Diet


Wound Care Instructions: Ok to shower and get incision wet.  Be gentle.  No 

soaking in the water.


Activity/Weight Bearing Restrictions: No lifting more than 10 pounds or bending/

twisting.  Wear brace when out of bed.


Additional Instructions: 


1.  Follow up with Dr. Alcaraz in 2 weeks.  [phone]


2.  Wear brace when out of bed.  Refrain from lifting more than 10 pounds and 

bending/twisting.


3.  OK to shower and get incision wet.


4.  Avoid NSAIDs as these inhibit the fusion process.


5.  Call Dr. Alcaraz's office with any questions/concerns.  





- Follow Up Care


Current Providers and Referrals: 


KD HALL [Primary Care Provider] - 


Brad Leal MD [Medical Doctor] - follow up in 2 weeks


Finesse Alcaraz MD [Medical Doctor] - follow up in 2 weeks

## 2019-03-20 NOTE — NEUSURGPN
Date of Surgery: 03/15/19


Post Op Day: 5


Assessment/Plan: 


Assessment: 74 yo M POD #5 L5/S1 ALIF and L4-S1 posterior fusion








Plan:


stable and doing well overall 


PT/OT


scd/sherly/lovenox for dvt prophylaxis


post op x-rays demonstrate intact hardware without evidence of failure


diet per general surgery


Dispo: to rehab today 


please call with neuro changes


Discussed with Dr. Alcaraz





Subjective: 


No lower extremity pain, numbness, tingling.  Surgical pain controlled.


Objective: 


Awake. Alert. PERRL. EOMI


Muscle strength full at 5/5


Sensation intact


Catheter Insertion Date: 03/15/19





- Physician


Discussed Patient with : Lissa





Neurosurgery Physical Exam





- Vitals, I&O, Labs





 I and O











 03/19/19 03/20/19 03/21/19





 05:59 05:59 05:59


 


Intake Total 250  


 


Output Total 870 450 


 


Balance -620 -450 


 


Intake:   


 


  Oral (ml) 250  


 


Output:   


 


  Urine (ml) 870 450 


 


    Toilet  150 


 


    Urinal 870 300 


 


Other:   


 


  Intake Quantity  Yes 





  Sufficient   


 


  Number of Voids   


 


    Toilet 1 1 


 


    Urinal 1 2 


 


  Number of Stools   


 


    Toilet 1  








 Microbiology











 03/15/19 12:24 Gram Stain - Final





 Other - Eswab 








 Vital Signs











Temp Pulse Resp BP Pulse Ox


 


 36.6 C   71   16   126/71 H  96 


 


 03/19/19 23:03  03/19/19 23:03  03/19/19 23:03  03/19/19 23:03  03/19/19 23:03








 Laboratory Results





 03/15/19 13:35 











ICD10 Worksheet


Patient Problems: 


 Problems











Problem Status Onset


 


Pseudarthrosis after fusion or arthrodesis Acute

## 2019-03-22 NOTE — GOP
[f rep st]



                                                                OPERATIVE REPORT





DATE OF OPERATION:  03/15/2019



SURGEON:  Brad Leal MD



ASSISTANT:  Danay Meier, nurse practitioner.



ANESTHESIOLOGIST:  Dr. Trevizo.



PREOPERATIVE DIAGNOSIS:  Spinal instability.



POSTOPERATIVE DIAGNOSIS:  Spinal instability.



PROCEDURE PERFORMED:  L5-S1 anterior spine exposure for spinal stabilization.



FINDINGS:  The patient was found to have large iliac veins but they were quite mobile.





ESTIMATED BLOOD LOSS:  Blood loss from our part of the procedure was less than 20 cc.



DESCRIPTION OF PROCEDURE:  The patient was taken to the operating room where he received a satisfacto
ry general endotracheal anesthesia by Dr. Trevizo.  He was placed in a supine position, prepped and 
draped in the usual sterile fashion.  A low Pfannenstiel incision was made and carried down through t
he anterior rectus sheath.  The rectus sheath was dissected away from the muscle above and below this
 point, and then the peritoneum was entered in the midline.  The small bowel was packed away as was t
he sigmoid colon and the use of Omni retractor maintained exposure.  The midline peritoneum was opene
d over the sacral prominence, and dissection extended down to the spine.  The L5-S1 space was identif
ied with fluoroscopy, and wide exposure was achieved of this area, dissecting away and elevating up t
he iliac vessels from the top of the L5 vertebra.  The sacral artery and veins were hemoclipped and d
ivided.  The disk space was then fully exposed with the use of the Omni retractor and at that point, 
the case was turned over to Dr. Alcaraz for anterior spinal fusion.  Once procedure was completed, we 
then closed the retroperitoneum with a running 0 Vicryl suture, and then the peritoneum with a runnin
g 0 Vicryl suture after all sponges and retractors were removed.  The fascia was closed with a runnin
g #1 PDS suture for the anterior rectus sheath, 3-0 Vicryl for the subcu, and 3-0 Monocryl subcuticul
ar stitch for the skin.  All layers were infiltrated with 0.5% Marcaine.  He tolerated the procedure 
well with no complications.





Job #:  [id number]